# Patient Record
Sex: FEMALE | Race: BLACK OR AFRICAN AMERICAN | NOT HISPANIC OR LATINO | Employment: UNEMPLOYED | URBAN - METROPOLITAN AREA
[De-identification: names, ages, dates, MRNs, and addresses within clinical notes are randomized per-mention and may not be internally consistent; named-entity substitution may affect disease eponyms.]

---

## 2019-07-02 ENCOUNTER — TELEPHONE (OUTPATIENT)
Dept: OBSTETRICS AND GYNECOLOGY | Facility: CLINIC | Age: 29
End: 2019-07-02

## 2019-07-02 NOTE — TELEPHONE ENCOUNTER
----- Message from Benitez Mims sent at 7/2/2019  2:50 PM CDT -----  Contact: [phone]  Pt would be a new pregnancy pt and would  like to schedule an appt. Please call back at [phone].       Thank You,   Benitez Mims

## 2019-07-02 NOTE — TELEPHONE ENCOUNTER
Spoke to patient and scheduled her appointment for 07/05/19 at 7:40am to see CINDI Christine at the [place] location. Patient verbalized understanding. Aware to arrive 30 min prior to appt time.

## 2019-11-20 ENCOUNTER — TELEPHONE (OUTPATIENT)
Dept: OBSTETRICS AND GYNECOLOGY | Facility: CLINIC | Age: 29
End: 2019-11-20

## 2019-11-20 NOTE — TELEPHONE ENCOUNTER
----- Message from Laron Lopez sent at 11/19/2019  4:47 PM CST -----  Contact: pt   Pt would like to margo post partum appt, pt had bady 8/23/19         [phone]

## 2019-11-20 NOTE — TELEPHONE ENCOUNTER
"Returned call, she confirmed message.  Asked where did she delivered, she responded [facility] per Terrie.  Asked the date of delivery, she responded "August 26," per Terrie.  Made her aware that her postpartum period is most likely over, and encouraged her to contact [facility] for postpartum information, she verbalized understanding.  Asked her where she received prenatal care, she responded "[facility]," and also mentioned having her baby at [facility].  Asked her if she delivered at the [facility] or [facility], because of hearing both, she responded "[facility]."  Encouraged her to contact [facility] for care, and also made her aware that I could provide her with [facility] contact number.  She verbalized understanding and said that she is going to contact [facility].  "

## 2020-02-04 ENCOUNTER — OFFICE VISIT (OUTPATIENT)
Dept: PODIATRY | Facility: CLINIC | Age: 30
End: 2020-02-04
Payer: MEDICAID

## 2020-02-04 VITALS
DIASTOLIC BLOOD PRESSURE: 71 MMHG | WEIGHT: 126.56 LBS | SYSTOLIC BLOOD PRESSURE: 101 MMHG | HEIGHT: 63 IN | BODY MASS INDEX: 22.43 KG/M2 | HEART RATE: 48 BPM

## 2020-02-04 DIAGNOSIS — M79.672 LEFT FOOT PAIN: ICD-10-CM

## 2020-02-04 DIAGNOSIS — L85.1 ACQUIRED PLANTAR POROKERATOSIS: Primary | ICD-10-CM

## 2020-02-04 PROCEDURE — 99213 OFFICE O/P EST LOW 20 MIN: CPT | Mod: PBBFAC | Performed by: PODIATRIST

## 2020-02-04 PROCEDURE — 99203 PR OFFICE/OUTPT VISIT, NEW, LEVL III, 30-44 MIN: ICD-10-PCS | Mod: S$PBB,,, | Performed by: PODIATRIST

## 2020-02-04 PROCEDURE — 99999 PR PBB SHADOW E&M-EST. PATIENT-LVL III: CPT | Mod: PBBFAC,,, | Performed by: PODIATRIST

## 2020-02-04 PROCEDURE — 99203 OFFICE O/P NEW LOW 30 MIN: CPT | Mod: S$PBB,,, | Performed by: PODIATRIST

## 2020-02-04 PROCEDURE — 99999 PR PBB SHADOW E&M-EST. PATIENT-LVL III: ICD-10-PCS | Mod: PBBFAC,,, | Performed by: PODIATRIST

## 2020-02-04 RX ORDER — AZITHROMYCIN 250 MG/1
TABLET, FILM COATED ORAL
COMMUNITY
End: 2021-01-11

## 2020-02-04 RX ORDER — LEVETIRACETAM 100 MG/ML
SOLUTION ORAL
COMMUNITY
End: 2021-01-11

## 2020-02-04 NOTE — PROGRESS NOTES
Subjective:       Patient ID: Terrie Diaz is a 29 y.o. female.    Chief Complaint: Foot Pain (L foot corn. Rates pain 8/10. Non diabetic Pt. Wears casual shoes. PCP DR Altamirano.)    HPI: Terrie Diaz presents to the office today, with plantar left foot pain.  Patient states she has developed a corn to the plantar aspect of the left foot.  Report to the she in this lesion following her pregnancy.  Currently her youngest child is 5-month-old.  States she has been dealing with the discomfort for that period.  Reports that she has tried using over-the-counter corn pads without success.  She also reports that she has tried trimming this lesion down however it had reoccurred.  States that the only thing that makes it better is getting off her feet.  Reports that her pain is an 8/10.  She presents today in clinic wearing flats with no added support to the bottom of her foot.    Review of patient's allergies indicates:  No Known Allergies    History reviewed. No pertinent past medical history.    History reviewed. No pertinent family history.    Social History     Socioeconomic History    Marital status: Significant Other     Spouse name: Not on file    Number of children: Not on file    Years of education: Not on file    Highest education level: Not on file   Occupational History    Not on file   Social Needs    Financial resource strain: Not on file    Food insecurity:     Worry: Not on file     Inability: Not on file    Transportation needs:     Medical: Not on file     Non-medical: Not on file   Tobacco Use    Smoking status: Never Smoker    Smokeless tobacco: Never Used   Substance and Sexual Activity    Alcohol use: Not on file    Drug use: Not on file    Sexual activity: Not on file   Lifestyle    Physical activity:     Days per week: Not on file     Minutes per session: Not on file    Stress: Not on file   Relationships    Social connections:     Talks on phone: Not on file     Gets together:  "Not on file     Attends Bahai service: Not on file     Active member of club or organization: Not on file     Attends meetings of clubs or organizations: Not on file     Relationship status: Not on file   Other Topics Concern    Not on file   Social History Narrative    Not on file       History reviewed. No pertinent surgical history.    Review of Systems   Constitutional: Negative for activity change, appetite change, chills and fever.   HENT: Negative for sinus pain, sore throat and voice change.    Eyes: Negative for pain, redness and visual disturbance.   Respiratory: Negative for cough and shortness of breath.    Cardiovascular: Negative for chest pain and palpitations.   Gastrointestinal: Negative for diarrhea, nausea and vomiting.   Musculoskeletal: Negative for back pain and joint swelling.   Skin: Negative for color change and wound.   Neurological: Negative for dizziness, weakness and numbness.   Psychiatric/Behavioral: The patient is not nervous/anxious.           Objective:   /71 (BP Location: Left arm, Patient Position: Sitting, BP Method: Small (Automatic))   Pulse (!) 48   Ht 5' 3" (1.6 m)   Wt 57.4 kg (126 lb 8.7 oz)   BMI 22.42 kg/m²     No image results found.       Physical Exam   LOWER EXTREMITY PHYSICAL EXAMINATION    Vascular:  Dorsalis pedis pulse on the right 2/4, on the left 2/4.  Posterior tibial pulse on the right 2/4, on the left 2/4.  Capillary refill intact less than 3 sec of bilateral lower extremities.  Pedal hair growth is present to the dorsal aspect of the foot and digits bilaterally.  No presence of edema to lower extremities. Rubor on dependency is noted.   Neurological:  Sensation light touch, proprioception, sensation to pinprick, protective sensation are present and equal bilaterally. No numbness or tingling identified to the digits. Negative Tinel sign.    Musculoskeletal:  Pain on direct palpation of a plantar left foot lesion sub 4th metatarsal head area.  " Manual Muscle Testing is 5/5 with dorsiflexion, plantar flexion, abduction, and adduction.   There is normal range of motion in the forefoot, hindfoot, and Ankle joint. No pain with muscle testing with or without resistance. Gait pattern is non-antalgic.     Dermatological:  Skin is supple, moist, intact.  There is a 0.4 x 0.3 cm lesion to the plantar aspect of the left foot near the 4th metatarsal head region.  There is a centralized white cord this area.  Area is likely associated with a verruca or porokeratosis.  Upon debridement/trimming of the thickened hyperkeratotic tissue, there is no pinpoint bleeding.  There is a white centralize core which was removed.  Discussed with patient this is a porokeratosis.  There is no ulceration, callusing, or other lesions identified to the plantar aspect of the left foot.  thin, shiny, and atrophic.  Hair growth is intact.    Assessment:     1. Acquired plantar porokeratosis    2. Left foot pain        Plan:     Acquired plantar porokeratosis    Left foot pain      Discussed with patient the etiology of porokeratosis.  Hypertrophic skin formation, as outlined within the examination portion of this note, is/are trimmed/pared surgically debrided with sharp #10/#15 blade, to alleviate discomfort with weight bearing and ambulation, and to lessen the possibility of skin complications, e.g., ulceration due to pressure. No ulceration(s) is are noted with/post debridement.     Encouraged the patient to utilize mole in or Vaseline petroleum jelly following bathing or foot soaks to keep the skin moist and supple while the area continues to heal.  Also encouraged patient to apply socks following this so she does not encounter any falls or slipping.    Recommend patient to utilize offloading pads/horseshoe shape pad to this area to prevent excessive overloading at this location allow the skin to heal appropriately.      No future appointments.

## 2020-03-23 ENCOUNTER — TELEPHONE (OUTPATIENT)
Dept: PODIATRY | Facility: CLINIC | Age: 30
End: 2020-03-23

## 2020-03-23 NOTE — TELEPHONE ENCOUNTER
Contacted pt regarding appointment request, pt stated she was experiencing pain in left foot corn. Advised pt as it was a non emergent condition, her appointment request will have to be moved 30 days due to covid-19. Advised pt to use an offloading pad and apply vaseline to corn after shower. Pt verbalized understanding and an appointment was scheduled for 4/23/2020 at 9:20 am.             Thank You,  Danna Lindsay MA  Podiatry Surgical Department  [facility]                    ----- Message from Tamara Stuart sent at 3/21/2020  9:03 AM CDT -----  Contact: PT   PT is requesting a call back in regards to having a soon appointment for a corn on her right foot   She can be reached at [phone]    Thanks

## 2020-04-20 ENCOUNTER — TELEPHONE (OUTPATIENT)
Dept: PODIATRY | Facility: CLINIC | Age: 30
End: 2020-04-20

## 2020-04-20 NOTE — TELEPHONE ENCOUNTER
Attempted to contact patient regarding appointment on 4/23/2020. Patient hung up mid conversation,  a second and third attempt was made to contact pt. She did not answer, left detailed voice message with number requesting call back.        Thank You,  Danna Lindsay MA  Podiatry Surgical Department  [facility]

## 2020-04-20 NOTE — TELEPHONE ENCOUNTER
Returned pt's call regarding appointment for foot corn. Advised pt that as her appointment is considered cosmetic, she would have to pay $42. Pt expressed anger and hung up the phone. I tried calling back, however phone call was sent to [facility].        Thank You,  Danna Lindsay MA  Podiatry Surgical Department  [facility]                              ----- Message from Gail Harvey sent at 4/20/2020 12:20 PM CDT -----  Contact: charla/Adebayo -[phone]  Type:  Patient Returning Call    Who Called:.Adebayo  Who Left Message for Patient:nurse  Does the patient know what this is regarding?:appt  Would the patient rather a call back or a response via [facility]? Call back   Best Call Back Number:[phone]  Additional Information:

## 2021-01-11 ENCOUNTER — OFFICE VISIT (OUTPATIENT)
Dept: OBSTETRICS AND GYNECOLOGY | Facility: CLINIC | Age: 31
End: 2021-01-11
Payer: MEDICAID

## 2021-01-11 ENCOUNTER — LAB VISIT (OUTPATIENT)
Dept: LAB | Facility: HOSPITAL | Age: 31
End: 2021-01-11
Attending: ADVANCED PRACTICE MIDWIFE
Payer: MEDICARE

## 2021-01-11 VITALS
DIASTOLIC BLOOD PRESSURE: 62 MMHG | SYSTOLIC BLOOD PRESSURE: 106 MMHG | HEIGHT: 63 IN | BODY MASS INDEX: 23.86 KG/M2 | WEIGHT: 134.69 LBS

## 2021-01-11 DIAGNOSIS — O09.30 LATE PRENATAL CARE: ICD-10-CM

## 2021-01-11 DIAGNOSIS — Z32.00 POSSIBLE PREGNANCY: ICD-10-CM

## 2021-01-11 DIAGNOSIS — O09.299 HISTORY OF PRE-ECLAMPSIA IN PRIOR PREGNANCY, CURRENTLY PREGNANT: ICD-10-CM

## 2021-01-11 DIAGNOSIS — Z32.00 POSSIBLE PREGNANCY: Primary | ICD-10-CM

## 2021-01-11 DIAGNOSIS — Z98.891 HISTORY OF VAGINAL DELIVERY FOLLOWING PREVIOUS CESAREAN DELIVERY: ICD-10-CM

## 2021-01-11 LAB
BACTERIA #/AREA URNS HPF: ABNORMAL /HPF
BASOPHILS # BLD AUTO: 0.02 K/UL (ref 0–0.2)
BASOPHILS NFR BLD: 0.2 % (ref 0–1.9)
BILIRUB UR QL STRIP: NEGATIVE
CLARITY UR: ABNORMAL
COLOR UR: YELLOW
DIFFERENTIAL METHOD: ABNORMAL
EOSINOPHIL # BLD AUTO: 0.1 K/UL (ref 0–0.5)
EOSINOPHIL NFR BLD: 1.1 % (ref 0–8)
ERYTHROCYTE [DISTWIDTH] IN BLOOD BY AUTOMATED COUNT: 13.2 % (ref 11.5–14.5)
GLUCOSE UR QL STRIP: ABNORMAL
HCT VFR BLD AUTO: 31.4 % (ref 37–48.5)
HGB BLD-MCNC: 9.6 G/DL (ref 12–16)
HGB UR QL STRIP: NEGATIVE
IMM GRANULOCYTES # BLD AUTO: 0.05 K/UL (ref 0–0.04)
IMM GRANULOCYTES NFR BLD AUTO: 0.5 % (ref 0–0.5)
KETONES UR QL STRIP: NEGATIVE
LEUKOCYTE ESTERASE UR QL STRIP: NEGATIVE
LYMPHOCYTES # BLD AUTO: 1.4 K/UL (ref 1–4.8)
LYMPHOCYTES NFR BLD: 14.5 % (ref 18–48)
MCH RBC QN AUTO: 31.2 PG (ref 27–31)
MCHC RBC AUTO-ENTMCNC: 30.6 G/DL (ref 32–36)
MCV RBC AUTO: 102 FL (ref 82–98)
MICROSCOPIC COMMENT: ABNORMAL
MONOCYTES # BLD AUTO: 0.8 K/UL (ref 0.3–1)
MONOCYTES NFR BLD: 7.9 % (ref 4–15)
NEUTROPHILS # BLD AUTO: 7.5 K/UL (ref 1.8–7.7)
NEUTROPHILS NFR BLD: 75.8 % (ref 38–73)
NITRITE UR QL STRIP: NEGATIVE
NRBC BLD-RTO: 0 /100 WBC
PH UR STRIP: 6 [PH] (ref 5–8)
PLATELET # BLD AUTO: 154 K/UL (ref 150–350)
PMV BLD AUTO: 11.8 FL (ref 9.2–12.9)
PROT UR QL STRIP: NEGATIVE
RBC # BLD AUTO: 3.08 M/UL (ref 4–5.4)
RBC #/AREA URNS HPF: 4 /HPF (ref 0–4)
SP GR UR STRIP: 1.02 (ref 1–1.03)
SQUAMOUS #/AREA URNS HPF: 7 /HPF
URN SPEC COLLECT METH UR: ABNORMAL
WBC # BLD AUTO: 9.88 K/UL (ref 3.9–12.7)

## 2021-01-11 PROCEDURE — 81000 URINALYSIS NONAUTO W/SCOPE: CPT

## 2021-01-11 PROCEDURE — 87491 CHLMYD TRACH DNA AMP PROBE: CPT

## 2021-01-11 PROCEDURE — 85025 COMPLETE CBC W/AUTO DIFF WBC: CPT

## 2021-01-11 PROCEDURE — 99204 PR OFFICE/OUTPT VISIT, NEW, LEVL IV, 45-59 MIN: ICD-10-PCS | Mod: S$GLB,,, | Performed by: ADVANCED PRACTICE MIDWIFE

## 2021-01-11 PROCEDURE — 85660 RBC SICKLE CELL TEST: CPT

## 2021-01-11 PROCEDURE — 86592 SYPHILIS TEST NON-TREP QUAL: CPT

## 2021-01-11 PROCEDURE — 80074 ACUTE HEPATITIS PANEL: CPT

## 2021-01-11 PROCEDURE — 86703 HIV-1/HIV-2 1 RESULT ANTBDY: CPT

## 2021-01-11 PROCEDURE — 99213 OFFICE O/P EST LOW 20 MIN: CPT | Mod: PBBFAC,TH | Performed by: ADVANCED PRACTICE MIDWIFE

## 2021-01-11 PROCEDURE — 87086 URINE CULTURE/COLONY COUNT: CPT

## 2021-01-11 PROCEDURE — 99999 PR PBB SHADOW E&M-EST. PATIENT-LVL III: CPT | Mod: PBBFAC,,, | Performed by: ADVANCED PRACTICE MIDWIFE

## 2021-01-11 PROCEDURE — 87591 N.GONORRHOEAE DNA AMP PROB: CPT

## 2021-01-11 PROCEDURE — 86900 BLOOD TYPING SEROLOGIC ABO: CPT

## 2021-01-11 PROCEDURE — 86762 RUBELLA ANTIBODY: CPT

## 2021-01-11 PROCEDURE — 99204 OFFICE O/P NEW MOD 45 MIN: CPT | Mod: S$GLB,,, | Performed by: ADVANCED PRACTICE MIDWIFE

## 2021-01-11 PROCEDURE — 36415 COLL VENOUS BLD VENIPUNCTURE: CPT

## 2021-01-11 PROCEDURE — 88175 CYTOPATH C/V AUTO FLUID REDO: CPT

## 2021-01-11 PROCEDURE — 99999 PR PBB SHADOW E&M-EST. PATIENT-LVL III: ICD-10-PCS | Mod: PBBFAC,,, | Performed by: ADVANCED PRACTICE MIDWIFE

## 2021-01-12 PROBLEM — O09.30 LATE PRENATAL CARE: Status: ACTIVE | Noted: 2021-01-12

## 2021-01-12 PROBLEM — O09.299 HISTORY OF PRE-ECLAMPSIA IN PRIOR PREGNANCY, CURRENTLY PREGNANT: Status: ACTIVE | Noted: 2021-01-12

## 2021-01-12 PROBLEM — Z98.891 HISTORY OF VAGINAL DELIVERY FOLLOWING PREVIOUS CESAREAN DELIVERY: Status: ACTIVE | Noted: 2021-01-12

## 2021-01-12 LAB
ABO + RH BLD: NORMAL
B-HCG UR QL: POSITIVE
BLD GP AB SCN CELLS X3 SERPL QL: NORMAL
CTP QC/QA: YES
HAV IGM SERPL QL IA: NEGATIVE
HBV CORE IGM SERPL QL IA: NEGATIVE
HBV SURFACE AG SERPL QL IA: NEGATIVE
HCV AB SERPL QL IA: NEGATIVE
HGB S BLD QL SOLY: NEGATIVE
HIV 1+2 AB+HIV1 P24 AG SERPL QL IA: NEGATIVE
RPR SER QL: NORMAL
RUBV IGG SER-ACNC: 122 IU/ML
RUBV IGG SER-IMP: REACTIVE

## 2021-01-13 LAB
C TRACH DNA SPEC QL NAA+PROBE: NOT DETECTED
N GONORRHOEA DNA SPEC QL NAA+PROBE: NOT DETECTED

## 2021-01-14 ENCOUNTER — TELEPHONE (OUTPATIENT)
Dept: OBSTETRICS AND GYNECOLOGY | Facility: CLINIC | Age: 31
End: 2021-01-14

## 2021-01-14 LAB — BACTERIA UR CULT: NORMAL

## 2021-01-14 RX ORDER — PROMETHAZINE HYDROCHLORIDE 12.5 MG/1
12.5 TABLET ORAL 4 TIMES DAILY
Qty: 30 TABLET | Refills: 2 | Status: ON HOLD | OUTPATIENT
Start: 2021-01-14 | End: 2023-04-26 | Stop reason: HOSPADM

## 2021-01-19 ENCOUNTER — INITIAL PRENATAL (OUTPATIENT)
Dept: OBSTETRICS AND GYNECOLOGY | Facility: CLINIC | Age: 31
End: 2021-01-19
Payer: MEDICARE

## 2021-01-19 ENCOUNTER — PROCEDURE VISIT (OUTPATIENT)
Dept: OBSTETRICS AND GYNECOLOGY | Facility: CLINIC | Age: 31
End: 2021-01-19
Payer: MEDICARE

## 2021-01-19 ENCOUNTER — PATIENT MESSAGE (OUTPATIENT)
Dept: OBSTETRICS AND GYNECOLOGY | Facility: CLINIC | Age: 31
End: 2021-01-19

## 2021-01-19 VITALS
BODY MASS INDEX: 22.81 KG/M2 | WEIGHT: 128.75 LBS | SYSTOLIC BLOOD PRESSURE: 122 MMHG | DIASTOLIC BLOOD PRESSURE: 68 MMHG

## 2021-01-19 DIAGNOSIS — Z32.00 POSSIBLE PREGNANCY: ICD-10-CM

## 2021-01-19 DIAGNOSIS — Z98.891 HISTORY OF VAGINAL DELIVERY FOLLOWING PREVIOUS CESAREAN DELIVERY: Primary | ICD-10-CM

## 2021-01-19 DIAGNOSIS — O09.299 HISTORY OF PRE-ECLAMPSIA IN PRIOR PREGNANCY, CURRENTLY PREGNANT: ICD-10-CM

## 2021-01-19 PROCEDURE — 99999 PR PBB SHADOW E&M-EST. PATIENT-LVL III: ICD-10-PCS | Mod: PBBFAC,,, | Performed by: ADVANCED PRACTICE MIDWIFE

## 2021-01-19 PROCEDURE — 0502F PR SUBSEQUENT PRENATAL CARE: ICD-10-PCS | Mod: S$GLB,,, | Performed by: ADVANCED PRACTICE MIDWIFE

## 2021-01-19 PROCEDURE — 0502F SUBSEQUENT PRENATAL CARE: CPT | Mod: S$GLB,,, | Performed by: ADVANCED PRACTICE MIDWIFE

## 2021-01-19 PROCEDURE — 76819 FETAL BIOPHYS PROFIL W/O NST: CPT | Mod: S$GLB,ICN,, | Performed by: OBSTETRICS & GYNECOLOGY

## 2021-01-19 PROCEDURE — 90686 IIV4 VACC NO PRSV 0.5 ML IM: CPT | Mod: PBBFAC

## 2021-01-19 PROCEDURE — 76819 PR US, OB, FETAL BIOPHYSICAL, W/O NST: ICD-10-PCS | Mod: S$GLB,ICN,, | Performed by: OBSTETRICS & GYNECOLOGY

## 2021-01-19 PROCEDURE — 99999 PR PBB SHADOW E&M-EST. PATIENT-LVL III: CPT | Mod: PBBFAC,,, | Performed by: ADVANCED PRACTICE MIDWIFE

## 2021-01-19 PROCEDURE — 76816 OB US FOLLOW-UP PER FETUS: CPT | Mod: S$GLB,ICN,, | Performed by: OBSTETRICS & GYNECOLOGY

## 2021-01-19 PROCEDURE — 76816 OB US FOLLOW-UP PER FETUS: CPT | Mod: PBBFAC | Performed by: OBSTETRICS & GYNECOLOGY

## 2021-01-19 PROCEDURE — 99213 OFFICE O/P EST LOW 20 MIN: CPT | Mod: PBBFAC,TH,25 | Performed by: ADVANCED PRACTICE MIDWIFE

## 2021-01-19 PROCEDURE — 76819 FETAL BIOPHYS PROFIL W/O NST: CPT | Mod: PBBFAC | Performed by: OBSTETRICS & GYNECOLOGY

## 2021-01-19 PROCEDURE — 90472 IMMUNIZATION ADMIN EACH ADD: CPT | Mod: PBBFAC

## 2021-01-19 PROCEDURE — 90715 TDAP VACCINE 7 YRS/> IM: CPT | Mod: PBBFAC

## 2021-01-19 PROCEDURE — 76816 PR  US,PREGNANT UTERUS,F/U,TRANSABD APP: ICD-10-PCS | Mod: S$GLB,ICN,, | Performed by: OBSTETRICS & GYNECOLOGY

## 2021-01-19 RX ORDER — SERTRALINE HYDROCHLORIDE 25 MG/1
25 TABLET, FILM COATED ORAL DAILY
Qty: 30 TABLET | Refills: 2 | Status: ON HOLD | OUTPATIENT
Start: 2021-01-19 | End: 2023-04-26 | Stop reason: HOSPADM

## 2021-01-19 RX ORDER — FERROUS SULFATE 325(65) MG
325 TABLET, DELAYED RELEASE (ENTERIC COATED) ORAL DAILY
Qty: 60 TABLET | Refills: 3 | Status: SHIPPED | OUTPATIENT
Start: 2021-01-19 | End: 2022-01-19

## 2021-01-24 LAB
FINAL PATHOLOGIC DIAGNOSIS: NORMAL
Lab: NORMAL

## 2021-02-16 ENCOUNTER — HOSPITAL ENCOUNTER (INPATIENT)
Facility: HOSPITAL | Age: 31
LOS: 2 days | Discharge: HOME | End: 2021-02-18
Attending: OBSTETRICS & GYNECOLOGY | Admitting: OBSTETRICS & GYNECOLOGY
Payer: MEDICARE

## 2021-02-16 DIAGNOSIS — O34.219 VBAC, DELIVERED: ICD-10-CM

## 2021-02-16 PROBLEM — O09.30 LATE PRENATAL CARE: Status: RESOLVED | Noted: 2021-01-12 | Resolved: 2021-02-16

## 2021-02-16 LAB
AMPHET+METHAMPHET UR QL: NEGATIVE
BARBITURATES UR QL SCN>200 NG/ML: NEGATIVE
BENZODIAZ UR QL SCN>200 NG/ML: NEGATIVE
BZE UR QL SCN: NEGATIVE
CANNABINOIDS UR QL SCN: NORMAL
CREAT UR-MCNC: 149.9 MG/DL (ref 15–325)
METHADONE UR QL SCN>300 NG/ML: NEGATIVE
OPIATES UR QL SCN: NEGATIVE
PCP UR QL SCN>25 NG/ML: NEGATIVE
SARS-COV-2 RDRP RESP QL NAA+PROBE: NEGATIVE
TOXICOLOGY INFORMATION: NORMAL

## 2021-02-16 PROCEDURE — 25000003 PHARM REV CODE 250: Performed by: MIDWIFE

## 2021-02-16 PROCEDURE — 80307 DRUG TEST PRSMV CHEM ANLYZR: CPT

## 2021-02-16 PROCEDURE — 59414 DELIVER PLACENTA: CPT | Mod: ,,, | Performed by: MIDWIFE

## 2021-02-16 PROCEDURE — 51701 INSERT BLADDER CATHETER: CPT

## 2021-02-16 PROCEDURE — 63600175 PHARM REV CODE 636 W HCPCS: Performed by: MIDWIFE

## 2021-02-16 PROCEDURE — 59414 PR DELIVER PLACENTA: ICD-10-PCS | Mod: ,,, | Performed by: MIDWIFE

## 2021-02-16 PROCEDURE — 11000001 HC ACUTE MED/SURG PRIVATE ROOM

## 2021-02-16 PROCEDURE — 59414 DELIVER PLACENTA: CPT

## 2021-02-16 PROCEDURE — U0002 COVID-19 LAB TEST NON-CDC: HCPCS

## 2021-02-16 RX ORDER — SIMETHICONE 80 MG
1 TABLET,CHEWABLE ORAL EVERY 6 HOURS PRN
Status: DISCONTINUED | OUTPATIENT
Start: 2021-02-16 | End: 2021-02-18 | Stop reason: HOSPADM

## 2021-02-16 RX ORDER — PRENATAL WITH FERROUS FUM AND FOLIC ACID 3080; 920; 120; 400; 22; 1.84; 3; 20; 10; 1; 12; 200; 27; 25; 2 [IU]/1; [IU]/1; MG/1; [IU]/1; MG/1; MG/1; MG/1; MG/1; MG/1; MG/1; UG/1; MG/1; MG/1; MG/1; MG/1
1 TABLET ORAL DAILY
Status: DISCONTINUED | OUTPATIENT
Start: 2021-02-16 | End: 2021-02-18 | Stop reason: HOSPADM

## 2021-02-16 RX ORDER — DIPHENHYDRAMINE HYDROCHLORIDE 50 MG/ML
25 INJECTION INTRAMUSCULAR; INTRAVENOUS EVERY 4 HOURS PRN
Status: DISCONTINUED | OUTPATIENT
Start: 2021-02-16 | End: 2021-02-18 | Stop reason: HOSPADM

## 2021-02-16 RX ORDER — DIPHENHYDRAMINE HCL 25 MG
25 CAPSULE ORAL EVERY 4 HOURS PRN
Status: DISCONTINUED | OUTPATIENT
Start: 2021-02-16 | End: 2021-02-18 | Stop reason: HOSPADM

## 2021-02-16 RX ORDER — ACETAMINOPHEN 325 MG/1
650 TABLET ORAL EVERY 6 HOURS PRN
Status: DISCONTINUED | OUTPATIENT
Start: 2021-02-16 | End: 2021-02-18 | Stop reason: HOSPADM

## 2021-02-16 RX ORDER — DOCUSATE SODIUM 100 MG/1
200 CAPSULE, LIQUID FILLED ORAL 2 TIMES DAILY PRN
Status: DISCONTINUED | OUTPATIENT
Start: 2021-02-16 | End: 2021-02-18 | Stop reason: HOSPADM

## 2021-02-16 RX ORDER — HYDROCORTISONE 25 MG/G
CREAM TOPICAL 3 TIMES DAILY PRN
Status: DISCONTINUED | OUTPATIENT
Start: 2021-02-16 | End: 2021-02-18 | Stop reason: HOSPADM

## 2021-02-16 RX ORDER — SERTRALINE HYDROCHLORIDE 25 MG/1
25 TABLET, FILM COATED ORAL DAILY
Status: DISCONTINUED | OUTPATIENT
Start: 2021-02-16 | End: 2021-02-17 | Stop reason: SDUPTHER

## 2021-02-16 RX ORDER — OXYTOCIN/RINGER'S LACTATE 30/500 ML
95 PLASTIC BAG, INJECTION (ML) INTRAVENOUS ONCE
Status: COMPLETED | OUTPATIENT
Start: 2021-02-16 | End: 2021-02-16

## 2021-02-16 RX ORDER — HYDROCODONE BITARTRATE AND ACETAMINOPHEN 5; 325 MG/1; MG/1
1 TABLET ORAL EVERY 4 HOURS PRN
Status: DISCONTINUED | OUTPATIENT
Start: 2021-02-16 | End: 2021-02-18 | Stop reason: HOSPADM

## 2021-02-16 RX ORDER — IBUPROFEN 600 MG/1
600 TABLET ORAL EVERY 6 HOURS
Status: DISCONTINUED | OUTPATIENT
Start: 2021-02-16 | End: 2021-02-18 | Stop reason: HOSPADM

## 2021-02-16 RX ORDER — ONDANSETRON 8 MG/1
8 TABLET, ORALLY DISINTEGRATING ORAL EVERY 8 HOURS PRN
Status: DISCONTINUED | OUTPATIENT
Start: 2021-02-16 | End: 2021-02-18 | Stop reason: HOSPADM

## 2021-02-16 RX ADMIN — Medication 999 MILLI-UNITS/MIN: at 02:02

## 2021-02-16 RX ADMIN — HYDROCODONE BITARTRATE AND ACETAMINOPHEN 1 TABLET: 5; 325 TABLET ORAL at 03:02

## 2021-02-16 RX ADMIN — HYDROCODONE BITARTRATE AND ACETAMINOPHEN 1 TABLET: 5; 325 TABLET ORAL at 10:02

## 2021-02-16 RX ADMIN — PROMETHAZINE HYDROCHLORIDE 12.5 MG: 25 INJECTION INTRAMUSCULAR; INTRAVENOUS at 02:02

## 2021-02-16 RX ADMIN — IBUPROFEN 600 MG: 600 TABLET ORAL at 11:02

## 2021-02-16 RX ADMIN — IBUPROFEN 600 MG: 600 TABLET ORAL at 03:02

## 2021-02-17 PROBLEM — Z86.59 HISTORY OF ANXIETY DISORDER: Status: ACTIVE | Noted: 2021-02-17

## 2021-02-17 PROCEDURE — 25000003 PHARM REV CODE 250: Performed by: MIDWIFE

## 2021-02-17 PROCEDURE — 11000001 HC ACUTE MED/SURG PRIVATE ROOM

## 2021-02-17 PROCEDURE — 99024 PR POST-OP FOLLOW-UP VISIT: ICD-10-PCS | Mod: ,,, | Performed by: ADVANCED PRACTICE MIDWIFE

## 2021-02-17 PROCEDURE — 25000003 PHARM REV CODE 250: Performed by: ADVANCED PRACTICE MIDWIFE

## 2021-02-17 PROCEDURE — 99024 POSTOP FOLLOW-UP VISIT: CPT | Mod: ,,, | Performed by: ADVANCED PRACTICE MIDWIFE

## 2021-02-17 RX ORDER — SERTRALINE HYDROCHLORIDE 25 MG/1
25 TABLET, FILM COATED ORAL DAILY
Status: DISCONTINUED | OUTPATIENT
Start: 2021-02-17 | End: 2021-02-18 | Stop reason: HOSPADM

## 2021-02-17 RX ORDER — CHLORHEXIDINE GLUCONATE ORAL RINSE 1.2 MG/ML
15 SOLUTION DENTAL 2 TIMES DAILY
Status: DISCONTINUED | OUTPATIENT
Start: 2021-02-17 | End: 2021-02-18 | Stop reason: HOSPADM

## 2021-02-17 RX ADMIN — PRENATAL VITAMINS-IRON FUMARATE 27 MG IRON-FOLIC ACID 0.8 MG TABLET 1 TABLET: at 08:02

## 2021-02-17 RX ADMIN — IBUPROFEN 600 MG: 600 TABLET ORAL at 11:02

## 2021-02-17 RX ADMIN — HYDROCODONE BITARTRATE AND ACETAMINOPHEN 1 TABLET: 5; 325 TABLET ORAL at 11:02

## 2021-02-17 RX ADMIN — HYDROCODONE BITARTRATE AND ACETAMINOPHEN 1 TABLET: 5; 325 TABLET ORAL at 07:02

## 2021-02-17 RX ADMIN — IBUPROFEN 600 MG: 600 TABLET ORAL at 05:02

## 2021-02-17 RX ADMIN — CHLORHEXIDINE GLUCONATE 0.12% ORAL RINSE 15 ML: 1.2 LIQUID ORAL at 11:02

## 2021-02-17 RX ADMIN — HYDROCODONE BITARTRATE AND ACETAMINOPHEN 1 TABLET: 5; 325 TABLET ORAL at 09:02

## 2021-02-17 RX ADMIN — SERTRALINE HYDROCHLORIDE 25 MG: 25 TABLET ORAL at 09:02

## 2021-02-18 ENCOUNTER — TELEPHONE (OUTPATIENT)
Dept: OBSTETRICS AND GYNECOLOGY | Facility: HOSPITAL | Age: 31
End: 2021-02-18

## 2021-02-18 ENCOUNTER — DOCUMENTATION ONLY (OUTPATIENT)
Dept: LACTATION | Facility: CLINIC | Age: 31
End: 2021-02-18

## 2021-02-18 VITALS
RESPIRATION RATE: 16 BRPM | DIASTOLIC BLOOD PRESSURE: 72 MMHG | TEMPERATURE: 98 F | SYSTOLIC BLOOD PRESSURE: 111 MMHG | OXYGEN SATURATION: 100 % | HEART RATE: 60 BPM

## 2021-02-18 PROCEDURE — 25000003 PHARM REV CODE 250: Performed by: MIDWIFE

## 2021-02-18 PROCEDURE — 25000003 PHARM REV CODE 250: Performed by: ADVANCED PRACTICE MIDWIFE

## 2021-02-18 RX ORDER — HYDROCODONE BITARTRATE AND ACETAMINOPHEN 5; 325 MG/1; MG/1
1 TABLET ORAL EVERY 6 HOURS PRN
Qty: 20 TABLET | Refills: 0 | Status: SHIPPED | OUTPATIENT
Start: 2021-02-18 | End: 2021-02-18

## 2021-02-18 RX ORDER — HYDROCODONE BITARTRATE AND ACETAMINOPHEN 5; 325 MG/1; MG/1
1 TABLET ORAL EVERY 6 HOURS PRN
Qty: 15 TABLET | Refills: 0 | Status: ON HOLD | OUTPATIENT
Start: 2021-02-18 | End: 2023-04-26 | Stop reason: HOSPADM

## 2021-02-18 RX ORDER — IBUPROFEN 600 MG/1
600 TABLET ORAL EVERY 6 HOURS PRN
Qty: 30 TABLET | Refills: 0 | Status: ON HOLD | OUTPATIENT
Start: 2021-02-18 | End: 2023-04-26 | Stop reason: HOSPADM

## 2021-02-18 RX ORDER — IBUPROFEN 600 MG/1
600 TABLET ORAL EVERY 6 HOURS PRN
Qty: 30 TABLET | Refills: 0 | Status: SHIPPED | OUTPATIENT
Start: 2021-02-18 | End: 2021-02-18

## 2021-02-18 RX ADMIN — SERTRALINE HYDROCHLORIDE 25 MG: 25 TABLET ORAL at 09:02

## 2021-02-18 RX ADMIN — CHLORHEXIDINE GLUCONATE 0.12% ORAL RINSE 15 ML: 1.2 LIQUID ORAL at 09:02

## 2021-02-18 RX ADMIN — PRENATAL VITAMINS-IRON FUMARATE 27 MG IRON-FOLIC ACID 0.8 MG TABLET 1 TABLET: at 09:02

## 2021-02-18 RX ADMIN — IBUPROFEN 600 MG: 600 TABLET ORAL at 05:02

## 2021-02-18 RX ADMIN — HYDROCODONE BITARTRATE AND ACETAMINOPHEN 1 TABLET: 5; 325 TABLET ORAL at 09:02

## 2021-02-21 ENCOUNTER — TELEPHONE (OUTPATIENT)
Dept: OBSTETRICS AND GYNECOLOGY | Facility: HOSPITAL | Age: 31
End: 2021-02-21

## 2021-04-27 ENCOUNTER — TELEPHONE (OUTPATIENT)
Dept: OBSTETRICS AND GYNECOLOGY | Facility: CLINIC | Age: 31
End: 2021-04-27

## 2021-12-20 PROBLEM — O09.299 HISTORY OF PRE-ECLAMPSIA IN PRIOR PREGNANCY, CURRENTLY PREGNANT: Status: RESOLVED | Noted: 2021-01-12 | Resolved: 2021-12-20

## 2022-02-04 ENCOUNTER — TELEPHONE (OUTPATIENT)
Dept: OBSTETRICS AND GYNECOLOGY | Facility: CLINIC | Age: 32
End: 2022-02-04
Payer: MEDICARE

## 2022-02-04 NOTE — TELEPHONE ENCOUNTER
----- Message from Colton Mims sent at 2/4/2022 10:08 AM CST -----  Contact: [phone]  Patient is calling to schedule an appt. Please call back at [phone]. Thanks/lorri

## 2023-04-25 ENCOUNTER — HOSPITAL ENCOUNTER (OUTPATIENT)
Facility: OTHER | Age: 33
Discharge: HOME | End: 2023-04-26
Attending: EMERGENCY MEDICINE | Admitting: INTERNAL MEDICINE
Payer: MEDICARE

## 2023-04-25 DIAGNOSIS — R00.0 TACHYCARDIA: ICD-10-CM

## 2023-04-25 DIAGNOSIS — R56.9 SEIZURE: Primary | ICD-10-CM

## 2023-04-25 DIAGNOSIS — R56.9 SEIZURES: ICD-10-CM

## 2023-04-25 PROBLEM — O34.219 VBAC, DELIVERED: Status: RESOLVED | Noted: 2021-02-16 | Resolved: 2023-04-25

## 2023-04-25 PROBLEM — Z87.59 HISTORY OF PRE-ECLAMPSIA: Chronic | Status: ACTIVE | Noted: 2021-01-12

## 2023-04-25 PROBLEM — Z86.59 HISTORY OF ANXIETY DISORDER: Chronic | Status: ACTIVE | Noted: 2021-02-17

## 2023-04-25 PROBLEM — Z87.59 HISTORY OF PRE-ECLAMPSIA: Status: ACTIVE | Noted: 2021-01-12

## 2023-04-25 PROBLEM — Z87.820 HISTORY OF TRAUMATIC BRAIN INJURY: Chronic | Status: ACTIVE | Noted: 2023-04-25

## 2023-04-25 PROBLEM — Z98.891 HISTORY OF VAGINAL DELIVERY FOLLOWING PREVIOUS CESAREAN DELIVERY: Chronic | Status: ACTIVE | Noted: 2021-01-12

## 2023-04-25 LAB
ALBUMIN SERPL BCP-MCNC: 3.9 G/DL (ref 3.5–5.2)
ALP SERPL-CCNC: 87 U/L (ref 55–135)
ALT SERPL W/O P-5'-P-CCNC: 27 U/L (ref 10–44)
AMPHET+METHAMPHET UR QL: NEGATIVE
ANION GAP SERPL CALC-SCNC: 17 MMOL/L (ref 8–16)
AST SERPL-CCNC: 30 U/L (ref 10–40)
B-HCG UR QL: NEGATIVE
BACTERIA #/AREA URNS HPF: ABNORMAL /HPF
BARBITURATES UR QL SCN>200 NG/ML: NEGATIVE
BASOPHILS # BLD AUTO: 0.05 K/UL (ref 0–0.2)
BASOPHILS NFR BLD: 0.6 % (ref 0–1.9)
BENZODIAZ UR QL SCN>200 NG/ML: NEGATIVE
BILIRUB SERPL-MCNC: 0.2 MG/DL (ref 0.1–1)
BILIRUB UR QL STRIP: NEGATIVE
BUN SERPL-MCNC: 17 MG/DL (ref 6–20)
BZE UR QL SCN: NEGATIVE
CALCIUM SERPL-MCNC: 9.2 MG/DL (ref 8.7–10.5)
CANNABINOIDS UR QL SCN: ABNORMAL
CHLORIDE SERPL-SCNC: 104 MMOL/L (ref 95–110)
CLARITY UR: ABNORMAL
CO2 SERPL-SCNC: 13 MMOL/L (ref 23–29)
COLOR UR: YELLOW
CREAT SERPL-MCNC: 1 MG/DL (ref 0.5–1.4)
CREAT UR-MCNC: 118.8 MG/DL (ref 15–325)
CTP QC/QA: YES
DIFFERENTIAL METHOD: ABNORMAL
EOSINOPHIL # BLD AUTO: 0.2 K/UL (ref 0–0.5)
EOSINOPHIL NFR BLD: 2.2 % (ref 0–8)
ERYTHROCYTE [DISTWIDTH] IN BLOOD BY AUTOMATED COUNT: 15.3 % (ref 11.5–14.5)
EST. GFR  (NO RACE VARIABLE): >60 ML/MIN/1.73 M^2
ETHANOL SERPL-MCNC: <10 MG/DL
GLUCOSE SERPL-MCNC: 104 MG/DL (ref 70–110)
GLUCOSE UR QL STRIP: NEGATIVE
HCT VFR BLD AUTO: 40.3 % (ref 37–48.5)
HCV AB SERPL QL IA: NEGATIVE
HGB BLD-MCNC: 12.7 G/DL (ref 12–16)
HGB UR QL STRIP: NEGATIVE
HIV 1+2 AB+HIV1 P24 AG SERPL QL IA: NEGATIVE
HYALINE CASTS #/AREA URNS LPF: 4 /LPF
IMM GRANULOCYTES # BLD AUTO: 0.04 K/UL (ref 0–0.04)
IMM GRANULOCYTES NFR BLD AUTO: 0.5 % (ref 0–0.5)
KETONES UR QL STRIP: ABNORMAL
LACTATE SERPL-SCNC: 0.6 MMOL/L (ref 0.5–2.2)
LACTATE SERPL-SCNC: 9.5 MMOL/L (ref 0.5–2.2)
LEUKOCYTE ESTERASE UR QL STRIP: ABNORMAL
LYMPHOCYTES # BLD AUTO: 1.6 K/UL (ref 1–4.8)
LYMPHOCYTES NFR BLD: 19.9 % (ref 18–48)
MAGNESIUM SERPL-MCNC: 2 MG/DL (ref 1.6–2.6)
MCH RBC QN AUTO: 28.9 PG (ref 27–31)
MCHC RBC AUTO-ENTMCNC: 31.5 G/DL (ref 32–36)
MCV RBC AUTO: 92 FL (ref 82–98)
METHADONE UR QL SCN>300 NG/ML: NEGATIVE
MICROSCOPIC COMMENT: ABNORMAL
MONOCYTES # BLD AUTO: 0.8 K/UL (ref 0.3–1)
MONOCYTES NFR BLD: 9.4 % (ref 4–15)
NEUTROPHILS # BLD AUTO: 5.4 K/UL (ref 1.8–7.7)
NEUTROPHILS NFR BLD: 67.4 % (ref 38–73)
NITRITE UR QL STRIP: NEGATIVE
NRBC BLD-RTO: 0 /100 WBC
OPIATES UR QL SCN: NEGATIVE
PCP UR QL SCN>25 NG/ML: NEGATIVE
PH UR STRIP: 6 [PH] (ref 5–8)
PLATELET # BLD AUTO: 213 K/UL (ref 150–450)
PLATELET BLD QL SMEAR: ABNORMAL
PMV BLD AUTO: 11.7 FL (ref 9.2–12.9)
POTASSIUM SERPL-SCNC: 4.3 MMOL/L (ref 3.5–5.1)
PROT SERPL-MCNC: 7.8 G/DL (ref 6–8.4)
PROT UR QL STRIP: ABNORMAL
RBC # BLD AUTO: 4.39 M/UL (ref 4–5.4)
RBC #/AREA URNS HPF: 1 /HPF (ref 0–4)
SODIUM SERPL-SCNC: 134 MMOL/L (ref 136–145)
SP GR UR STRIP: 1.02 (ref 1–1.03)
SQUAMOUS #/AREA URNS HPF: 25 /HPF
TOXICOLOGY INFORMATION: ABNORMAL
URN SPEC COLLECT METH UR: ABNORMAL
UROBILINOGEN UR STRIP-ACNC: NEGATIVE EU/DL
WBC # BLD AUTO: 8.05 K/UL (ref 3.9–12.7)
WBC #/AREA URNS HPF: 3 /HPF (ref 0–5)

## 2023-04-25 PROCEDURE — 82077 ASSAY SPEC XCP UR&BREATH IA: CPT | Performed by: NURSE PRACTITIONER

## 2023-04-25 PROCEDURE — 93010 EKG 12-LEAD: ICD-10-PCS | Mod: ,,, | Performed by: INTERNAL MEDICINE

## 2023-04-25 PROCEDURE — 99285 EMERGENCY DEPT VISIT HI MDM: CPT | Mod: 25

## 2023-04-25 PROCEDURE — 93005 ELECTROCARDIOGRAM TRACING: CPT

## 2023-04-25 PROCEDURE — G0378 HOSPITAL OBSERVATION PER HR: HCPCS

## 2023-04-25 PROCEDURE — 87389 HIV-1 AG W/HIV-1&-2 AB AG IA: CPT | Performed by: NURSE PRACTITIONER

## 2023-04-25 PROCEDURE — 63600175 PHARM REV CODE 636 W HCPCS: Performed by: NURSE PRACTITIONER

## 2023-04-25 PROCEDURE — 25000003 PHARM REV CODE 250: Performed by: NURSE PRACTITIONER

## 2023-04-25 PROCEDURE — 83605 ASSAY OF LACTIC ACID: CPT | Mod: 91 | Performed by: NURSE PRACTITIONER

## 2023-04-25 PROCEDURE — 25000003 PHARM REV CODE 250: Performed by: INTERNAL MEDICINE

## 2023-04-25 PROCEDURE — 80053 COMPREHEN METABOLIC PANEL: CPT | Performed by: NURSE PRACTITIONER

## 2023-04-25 PROCEDURE — 83605 ASSAY OF LACTIC ACID: CPT | Performed by: INTERNAL MEDICINE

## 2023-04-25 PROCEDURE — 96361 HYDRATE IV INFUSION ADD-ON: CPT

## 2023-04-25 PROCEDURE — 93010 ELECTROCARDIOGRAM REPORT: CPT | Mod: ,,, | Performed by: INTERNAL MEDICINE

## 2023-04-25 PROCEDURE — 96374 THER/PROPH/DIAG INJ IV PUSH: CPT

## 2023-04-25 PROCEDURE — 99223 1ST HOSP IP/OBS HIGH 75: CPT | Mod: ,,, | Performed by: INTERNAL MEDICINE

## 2023-04-25 PROCEDURE — 81000 URINALYSIS NONAUTO W/SCOPE: CPT | Mod: 59 | Performed by: NURSE PRACTITIONER

## 2023-04-25 PROCEDURE — 81025 URINE PREGNANCY TEST: CPT | Performed by: NURSE PRACTITIONER

## 2023-04-25 PROCEDURE — 83735 ASSAY OF MAGNESIUM: CPT | Performed by: NURSE PRACTITIONER

## 2023-04-25 PROCEDURE — 86803 HEPATITIS C AB TEST: CPT | Performed by: NURSE PRACTITIONER

## 2023-04-25 PROCEDURE — 80307 DRUG TEST PRSMV CHEM ANLYZR: CPT | Performed by: NURSE PRACTITIONER

## 2023-04-25 PROCEDURE — 99223 PR INITIAL HOSPITAL CARE,LEVL III: ICD-10-PCS | Mod: ,,, | Performed by: INTERNAL MEDICINE

## 2023-04-25 PROCEDURE — 85025 COMPLETE CBC W/AUTO DIFF WBC: CPT | Performed by: NURSE PRACTITIONER

## 2023-04-25 RX ORDER — ACETAMINOPHEN 325 MG/1
650 TABLET ORAL EVERY 4 HOURS PRN
Status: DISCONTINUED | OUTPATIENT
Start: 2023-04-25 | End: 2023-04-26 | Stop reason: HOSPADM

## 2023-04-25 RX ORDER — VALPROIC ACID 250 MG/1
500 CAPSULE, LIQUID FILLED ORAL 2 TIMES DAILY
Status: DISCONTINUED | OUTPATIENT
Start: 2023-04-25 | End: 2023-04-26 | Stop reason: HOSPADM

## 2023-04-25 RX ORDER — LEVETIRACETAM 500 MG/5ML
2000 INJECTION, SOLUTION, CONCENTRATE INTRAVENOUS
Status: COMPLETED | OUTPATIENT
Start: 2023-04-25 | End: 2023-04-25

## 2023-04-25 RX ORDER — SODIUM CHLORIDE 0.9 % (FLUSH) 0.9 %
10 SYRINGE (ML) INJECTION
Status: DISCONTINUED | OUTPATIENT
Start: 2023-04-25 | End: 2023-04-26 | Stop reason: HOSPADM

## 2023-04-25 RX ORDER — ONDANSETRON 2 MG/ML
4 INJECTION INTRAMUSCULAR; INTRAVENOUS EVERY 6 HOURS PRN
Status: DISCONTINUED | OUTPATIENT
Start: 2023-04-25 | End: 2023-04-26 | Stop reason: HOSPADM

## 2023-04-25 RX ORDER — LORAZEPAM 2 MG/ML
2 INJECTION INTRAMUSCULAR
Status: DISCONTINUED | OUTPATIENT
Start: 2023-04-25 | End: 2023-04-26 | Stop reason: HOSPADM

## 2023-04-25 RX ORDER — ONDANSETRON 4 MG/1
4 TABLET, ORALLY DISINTEGRATING ORAL EVERY 6 HOURS PRN
Status: DISCONTINUED | OUTPATIENT
Start: 2023-04-25 | End: 2023-04-26 | Stop reason: HOSPADM

## 2023-04-25 RX ORDER — TALC
6 POWDER (GRAM) TOPICAL NIGHTLY PRN
Status: DISCONTINUED | OUTPATIENT
Start: 2023-04-25 | End: 2023-04-26 | Stop reason: HOSPADM

## 2023-04-25 RX ADMIN — LEVETIRACETAM 2000 MG: 500 INJECTION, SOLUTION INTRAVENOUS at 09:04

## 2023-04-25 RX ADMIN — VALPROIC ACID 500 MG: 250 CAPSULE ORAL at 09:04

## 2023-04-25 RX ADMIN — SODIUM CHLORIDE 1000 ML: 9 INJECTION, SOLUTION INTRAVENOUS at 09:04

## 2023-04-25 NOTE — ED TRIAGE NOTES
"Pt arrived via EMS for seizures. She is unaware of seizure activity, boyfriend reports seizure x2 at [facility]. Pt has  been there to detox for 3 weeks. She states," she does not know what she is detoxing from."  "

## 2023-04-25 NOTE — NURSING TRANSFER
Nursing Transfer Note      4/25/2023     Reason patient is being transferred: observation    Transfer From: ED    Transfer via wheelchair    Transfer with neither    Transported by transport    Medicines sent: no    Any special needs or follow-up needed: no    Chart send with patient: Yes    Notified: stays at a facility    Patient reassessed at: 4/25/2023    Upon arrival to floor: cardiac monitor applied, patient oriented to room, call bell in reach, and bed in lowest position    Seizure precautions in place, side rails padded. Bed low and locked. Will continue plan of care

## 2023-04-25 NOTE — TREATMENT PLAN
I spoke to the emergency room provider in detail regarding this patient as I was contacted on the telephone.  Apparently patient has a history of traumatic brain injury and prolonged hospitalization for 6 months following this and a history of being on Keppra for seizures.  Currently she is not on any antiepileptic medications and has been put on multiple psychotropic medications and resides at the [facility].  Patient was brought into the hospital due to generalized tonic-clonic movements suggestive of seizure.  Patient is a poor historian and can not confirm nor deny multiple aspects of her clinical history.  There may be a postictal component at this time.  Patient was loaded with 2 g of IV Keppra.  After discussion with ED provider, given psychiatric issues and recent initiation of psychotropic medications, Keppra may not be the best choice as an antiepileptic medication for this patient.  I have recommended Depakote 500 mg twice a day as this has mood stabilizing properties and is an antiepileptic.  Recommend treating any infectious or metabolic derangements.  If patient has seizures or there any other concerns, feel free to formally consult tele Neurology.  Please evaluate patient for withdrawal.    Michelle Worrlel MD

## 2023-04-25 NOTE — ASSESSMENT & PLAN NOTE
h/o TBI  - Seizure-like activity with unknown history of prior seizures though denied by patient but with TBI history as likely seizure focus.  - No acute intracranial abnormality or overt symptoms to suggest alternative etiology of tonic-clonic activity noted.  - Denies routine medication use, though unclear if she has other medications she takes at this time. Received levetiracetam 2g IV x1 in ED.  - Appreciate neurology recommendations. Start valproic acid 500mg PO BID.  - Seizure precautions. Start lorazepam 2mg IV q15min PRN for seizure activity.  - Referral to CM, neurology outpatient to coordinate follow-up; appears she has a history of poor adherence to care likely related to her TBI.

## 2023-04-25 NOTE — HPI
Ms. Diaz is a 32/F with PMH of traumatic brain injury/stroke who [facility] with 2 episodes of seizure-like behavior. History aided by [facility] staff; limited information able to be provided by the patient herself. Reportedly she was admitted 03/28 to [facility] for alcohol detoxicification and then transitioned to the residential side. She had not reported to [facility] staff on presentation that she had any prior seizures, though chart review of Neurology/PCP evaluations from 2020 shows she had been on levetiracetam remotely (potentially preventative?). The morning of 04/25 she was witnessed by residents initially to have seizure-like activity and then a second episode seen by nursing staff lasting 45-60sec with tonic-clonic activity without loss of bowel or bladder control. She received lorazepam 2mg IM x1; EMS subsequently arrived and she was brought to ED for further evaluation. ED workup was significant for CT head with no acute abnormality but age-advanced cerebral volume loss with atypical pattern of white matter disease with frontal periventricular predominance, and lactic acidosis. Neurology was consulted and recommended valproic acid initiation and hospital medicine contacted for admission.

## 2023-04-25 NOTE — NURSING
Nurses Note -- 4 Eyes      4/25/2023   3:42 PM      Skin assessed during: Admit      [x] No Altered Skin Integrity Present    []Prevention Measures Documented      [] Yes- Altered Skin Integrity Present or Discovered   [] LDA Added if Not in Epic (Describe Wound)   [] New Altered Skin Integrity was Present on Admit and Documented in LDA   [] Wound Image Taken    Wound Care Consulted? No    Attending Nurse:  Monika Hillman RN     Second RN/Staff Member:  ZAHRA Salmeron

## 2023-04-25 NOTE — ED PROVIDER NOTES
"Source of History:  Patient, chart, EMS    Chief complaint:  Seizures (Pt had 2 episodes of witnessed seizure like activity while at [facility], given 2mg ativan by facility staff; denies hx of seizures, pt currently 28 days into detox )      HPI:  Terrie Diaz is a 32 y.o. female with medical history of substance abuse, head trauma presenting with possible seizure this morning.  As per EMS, pt resides at [facility] and has been there since .  Pt unsure why she is at rehab and denies any drug or alcohol use.  As per pt significant other pt possibly had a "full body seizure" this morning.  Unsure how long episode lasted.  Pt denies any seizure history.      This is the extent to the patients complaints today here in the emergency department.    ROS: As per HPI and below:    Constitutional: No fever.  No chills.  Eyes: No visual changes.  ENT: No sore throat. No ear pain    Cardiovascular: No chest pain.  Respiratory: No shortness of breath.  GI: No abdominal pain.  No nausea or vomiting.  Genitourinary: No abnormal urination.  Neurologic: No headache. No focal weakness.  No numbness. Positive for seizure like activity  MSK: no back pain.  Integument: No rashes or lesions.  Hematologic: No easy bruising.  Endocrine: No excessive thirst or urination.    Review of patient's allergies indicates:  No Known Allergies    PMH:  As per HPI and below:  Past Medical History:   Diagnosis Date    Trauma      Past Surgical History:   Procedure Laterality Date     SECTION         Social History     Tobacco Use    Smoking status: Light Smoker     Types: Cigarettes    Smokeless tobacco: Never   Substance Use Topics    Alcohol use: Not Currently    Drug use: Yes     Types: Marijuana       Physical Exam:    /67   Pulse 101   Temp 98 °F (36.7 °C) (Oral)   Resp 19   SpO2 100%     Nursing note and vital signs reviewed.    Constitutional: No acute distress.  Nontoxic  Eyes: No conjunctival " injection.Extraocular muscles are intact.  Pupils equal round reactive 2-1 mm.  ENT: Oropharynx clear.  Normal phonation.   Cardiovascular: Regular rate and rhythm.  No murmurs. No gallops. No rubs  Respiratory: Clear to auscultation bilaterally.  Good air movement.  No wheezes.  No rhonchi. No rales. No accessory muscle use..  Abdomen: Soft.  Not distended.  Nontender.  No guarding.  No rebound. Non-peritoneal.  Musculoskeletal: Good range of motion all joints.  No deformities.  Neck supple.  No meningismus.  Skin: No rashes seen.  Good turgor.  No abrasions.  No ecchymoses.  Neurologic: Answering questions appropriately.  Slightly delayed in responses.  Motor intact.  Sensation intact.  No ataxia. No focal neurological deficits.  Psych: Withdrawn    MDM    Emergent evaluation of a 33 yo female presenting for possible seizure-like activity at her rehab facility this morning.  Patient denies any drug or alcohol use but is a resident at [facility] since 04/04.  Patient states she does not know why she is at [facility].  As per significant other patient had full body convulsions this morning.  Patient does not remember the event.  No loss of bladder or bowel control.  No biting of the tongue.  On exam pt is A&Ox3.  Slightly tachycardic but all other vital signs within normal limits.  Patient inappropriately but is slightly delayed in responses.  GCS 15. Nonfebrile and nontoxic appearing.  Pupils equal round reactive 2-1 mm.  Mucous membranes pink and moist.  Breath sounds clear bilaterally.  Abdomen soft and nontender. No rebound or guarding appreciated on exam.   BS WNL.  Pt speaking in full sentences. Cap refill < 3 seconds.      History Acquisition   Additional history was acquired from other historians.  EMS, chart  Chart shows pt was on Keppra in the past with no known seizure diagnosis.  Last prescribed in 2021    The patient's list of active medical problems, social history, medications, and allergies  as documented per RN staff has been reviewed.     Differential Diagnoses   Based on available information and the initial assessment, the working differential diagnoses considered during this evaluation include but are not limited to new onset seizure, breakthrough seizure, withdrawal, electrolyte abnormality, dehydration, syncope, others. .    I will get labs, imaging and reassess.  I discussed ths case with my supervising physician.        LABS     Labs Reviewed   CBC W/ AUTO DIFFERENTIAL - Abnormal; Notable for the following components:       Result Value    MCHC 31.5 (*)     RDW 15.3 (*)     All other components within normal limits    Narrative:     Release to patient->Immediate   COMPREHENSIVE METABOLIC PANEL - Abnormal; Notable for the following components:    Sodium 134 (*)     CO2 13 (*)     Anion Gap 17 (*)     All other components within normal limits    Narrative:     Release to patient->Immediate   URINALYSIS, REFLEX TO URINE CULTURE - Abnormal; Notable for the following components:    Appearance, UA Hazy (*)     Protein, UA 1+ (*)     Ketones, UA Trace (*)     Leukocytes, UA Trace (*)     All other components within normal limits    Narrative:     Specimen Source->Urine   LACTIC ACID, PLASMA - Abnormal; Notable for the following components:    Lactate (Lactic Acid) 9.5 (*)     All other components within normal limits    Narrative:     Release to patient->Immediate   LA  critical result(s) called and verbal readback obtained from   Brennon Paul R.N. by TB3 04/25/2023 09:39   DRUG SCREEN PANEL, URINE EMERGENCY - Abnormal; Notable for the following components:    THC Presumptive Positive (*)     All other components within normal limits    Narrative:     Specimen Source->Urine   URINALYSIS MICROSCOPIC - Abnormal; Notable for the following components:    Bacteria Few (*)     Hyaline Casts, UA 4 (*)     All other components within normal limits    Narrative:     Specimen Source->Urine   MAGNESIUM     Narrative:     Release to patient->Immediate   ALCOHOL,MEDICAL (ETHANOL)    Narrative:     Release to patient->Immediate   HIV 1 / 2 ANTIBODY    Narrative:     Release to patient->Immediate   HEPATITIS C ANTIBODY    Narrative:     Release to patient->Immediate   POCT URINE PREGNANCY         Imaging     Imaging Results               CT Head Without Contrast (Final result)  Result time 04/25/23 09:20:01      Final result by Alexander Kramer MD (04/25/23 09:20:01)                   Impression:      No evidence of acute hemorrhage or major vascular distribution infarct.    Age advanced cerebral volume loss with atypical pattern of underlying white matter disease (frontal periventricular predominance).  Clinical correlation advised with further workup as warranted.    This report was flagged in Epic as abnormal.      Electronically signed by: Alexander Kramer MD  Date:    04/25/2023  Time:    09:20               Narrative:    EXAMINATION:  CT HEAD WITHOUT CONTRAST    CLINICAL HISTORY:  Seizure, new-onset, no history of trauma;    TECHNIQUE:  Low dose axial CT images obtained throughout the head without the use of intravenous contrast.  Axial, sagittal and coronal reconstructions were performed.    COMPARISON:  None.    FINDINGS:  Intracranial compartment:    Age advanced cerebral volume loss particularly with prominence of the ventricles and sulci.  Configuration not suggestive hydrocephalus.    Patchy hypoattenuation in the cerebral white matter with frontal periventricular predominance.  No evidence of recent or remote major vascular distribution infarct.  No acute hemorrhage.  No intracranial mass effect or midline shift.    No extra-axial blood or fluid collections.    Skull/extracranial contents (limited evaluation):    No displaced calvarial fracture.    Mastoid air cells are clear. Mild patchy mucosal thickening in the visualized paranasal sinuses.                                      A radiology report was  available for my review at the time of the encounter.    EKG   EKG Readings: (Independently Interpreted)   Initial Reading: No STEMI. Rhythm: Sinus Tachycardia. Heart Rate: 102.   My interpretation     Additional Consideration   All available testing was considered during the course of this workup.  Comorbidities taken into consideration during the patient's evaluation and treatment include weight, age.    Social determinants of health were taken into consideration during development of our treatment plan.    Medications   sodium chloride 0.9% bolus 1,000 mL 1,000 mL (1,000 mLs Intravenous New Bag 4/25/23 0943)   levETIRAcetam injection 2,000 mg (2,000 mg Intravenous Given 4/25/23 0948)      ED Course as of 04/25/23 1056   Tue Apr 25, 2023   0931 CT head negative for any acute abnormalities.  Imaging does show age advanced cerebral volume loss with atypical pattern of underlying white matter disease  [RZ]   0934 CBC unremarkable.  No leukocytosis noted.  H&H stable at 12.7 and 40.3.  CMP unremarkable.  Ethanol negative.  Mag within normal limits. [RZ]   0943 Lactic elevated at 9.5.  This is consistent with new onset versus breakthrough seizure.  Will give loading dose of Keppra.  Will discussed case with hospital medicine for admission for seizure activity. [RZ]   0949 Discussed case with hospital medicine.  Will consult neurology.  Awaiting bed assignment.  [RZ]   1000 Urine drug screen presumed positive for marijuana.  No other acute abnormalities.  Awaiting admission. [RZ]   1044 Discussed case with neurology- recommends starting on Depakote 500mg BID.  Will be available for consult.  Hospital medicine aware.  Awaiting bed assignment.     [RZ]      ED Course User Index  [RZ] Lisandra Marie NP             CLINICAL IMPRESSION  1. Seizures    2. Tachycardia         ED Disposition Condition    Observation Stable             Lisandra Marie NP  04/25/23 1056

## 2023-04-25 NOTE — ED NOTES
Pt ambulatory to the restroom independently; steady gait noted. Pt returned to room 03 without incident.

## 2023-04-25 NOTE — H&P
[facility] Medicine  History & Physical    Patient Name: Terrie Diaz  MRN: [id number]  Patient Class: OP- Observation  Admission Date: 4/25/2023  Attending Physician: EDINSON Calderón MD  Primary Care Provider: Ramon Altamirano NP    Patient information was obtained from patient, ER records and [facility] staff.     Subjective:     Principal Problem:Seizure    Chief Complaint:   Chief Complaint   Patient presents with    Seizures     Pt had 2 episodes of witnessed seizure like activity while at [facility], given 2mg ativan by facility staff; denies hx of seizures, pt currently 28 days into detox         HPI: Ms. Diaz is a 32/F with PMH of traumatic brain injury/stroke who presented to [facility] 04/25 with 2 episodes of seizure-like behavior. History aided by [facility] staff; limited information able to be provided by the patient herself. Reportedly she was admitted 03/28 to [facility] for alcohol detoxicification and then transitioned to the residential side. She had not reported to [facility] staff on presentation that she had any prior seizures, though chart review of Neurology/PCP evaluations from 2020 shows she had been on levetiracetam remotely (potentially preventative?). The morning of 04/25 she was witnessed by residents initially to have seizure-like activity and then a second episode seen by nursing staff lasting 45-60sec with tonic-clonic activity without loss of bowel or bladder control. She received lorazepam 2mg IM x1; EMS subsequently arrived and she was brought to ED for further evaluation. ED workup was significant for CT head with no acute abnormality but age-advanced cerebral volume loss with atypical pattern of white matter disease with frontal periventricular predominance, and lactic acidosis. Neurology was consulted and recommended valproic acid initiation and hospital medicine contacted for admission.      Past Medical History:   Diagnosis Date    Trauma         Past Surgical History:   Procedure Laterality Date     SECTION         Review of patient's allergies indicates:  No Known Allergies    No current facility-administered medications on file prior to encounter.     Current Outpatient Medications on File Prior to Encounter   Medication Sig    HYDROcodone-acetaminophen (NORCO) 5-325 mg per tablet Take 1 tablet by mouth every 6 (six) hours as needed for Pain.    ibuprofen (ADVIL,MOTRIN) 600 MG tablet Take 1 tablet (600 mg total) by mouth every 6 (six) hours as needed for Pain.    prenatal vit-iron fum-folic ac (RIGHT STEP PRENATAL VITAMINS) 27 mg iron- 0.8 mg Tab Take 1 tablet by mouth once daily.    promethazine (PHENERGAN) 12.5 MG Tab Take 1 tablet (12.5 mg total) by mouth 4 (four) times daily.    sertraline (ZOLOFT) 25 MG tablet Take 1 tablet (25 mg total) by mouth once daily.     Family History       Problem Relation (Age of Onset)    Diabetes Paternal Grandmother, Sister          Tobacco Use    Smoking status: Light Smoker     Types: Cigarettes    Smokeless tobacco: Never   Substance and Sexual Activity    Alcohol use: Not Currently    Drug use: Yes     Types: Marijuana    Sexual activity: Yes     Partners: Male     Review of Systems   Constitutional:  Negative for chills and fever.   HENT:  Negative for sore throat and trouble swallowing.    Eyes:  Negative for pain and visual disturbance.   Respiratory:  Negative for cough and shortness of breath.    Cardiovascular:  Negative for chest pain and palpitations.   Gastrointestinal:  Negative for abdominal pain, nausea and vomiting.   Genitourinary:  Negative for difficulty urinating and dysuria.   Musculoskeletal:  Negative for arthralgias and myalgias.   Skin:  Negative for rash and wound.   Neurological:  Positive for seizures. Negative for weakness and numbness.   Objective:     Vital Signs (Most Recent):  Temp: 98 °F (36.7 °C) (23 0829)  Pulse: 84 (23 1422)  Resp: 18 (23  "1422)  BP: (!) 96/51 (04/25/23 1422)  SpO2: 98 % (04/25/23 1422)   Vital Signs (24h Range):  Temp:  [98 °F (36.7 °C)] 98 °F (36.7 °C)  Pulse:  [] 84  Resp:  [15-19] 18  SpO2:  [98 %-100 %] 98 %  BP: ()/(51-85) 96/51        There is no height or weight on file to calculate BMI.    Physical Exam  Vitals and nursing note reviewed.   Constitutional:       General: She is not in acute distress.     Appearance: She is well-developed.   HENT:      Head: Normocephalic and atraumatic.   Eyes:      General:         Right eye: No discharge.         Left eye: No discharge.      Conjunctiva/sclera: Conjunctivae normal.   Cardiovascular:      Rate and Rhythm: Normal rate.      Pulses: Normal pulses.   Pulmonary:      Effort: Pulmonary effort is normal. No respiratory distress.   Abdominal:      Palpations: Abdomen is soft.      Tenderness: There is no abdominal tenderness.   Musculoskeletal:         General: Normal range of motion.      Right lower leg: No edema.      Left lower leg: No edema.   Skin:     General: Skin is warm and dry.   Neurological:      Mental Status: She is alert.      Comments: Oriented x 1 (self, "hospital - in [place]?", unable to answer year)      Significant Labs:   CBC:  Recent Labs   Lab 04/25/23  0844   WBC 8.05   HGB 12.7   HCT 40.3      GRAN 67.4  5.4   LYMPH 19.9  1.6   MONO 9.4  0.8   EOS 0.2   BASO 0.05   CMP:  Recent Labs   Lab 04/25/23  0844   *   K 4.3      CO2 13*   BUN 17   CREATININE 1.0      CALCIUM 9.2   MG 2.0   ALKPHOS 87   AST 30   ALT 27   BILITOT 0.2   PROT 7.8   ALBUMIN 3.9   ANIONGAP 17*     Recent Labs   Lab 04/25/23  0846   LACTATE 9.5*     Significant Imaging:   Imaging Results               CT Head Without Contrast (Final result)  Result time 04/25/23 09:20:01      Final result by Alexander Kramer MD (04/25/23 09:20:01)                   Impression:      No evidence of acute hemorrhage or major vascular distribution infarct.    Age " advanced cerebral volume loss with atypical pattern of underlying white matter disease (frontal periventricular predominance).  Clinical correlation advised with further workup as warranted.    This report was flagged in Epic as abnormal.      Electronically signed by: Alexander Kramer MD  Date:    04/25/2023  Time:    09:20               Narrative:    EXAMINATION:  CT HEAD WITHOUT CONTRAST    CLINICAL HISTORY:  Seizure, new-onset, no history of trauma;    TECHNIQUE:  Low dose axial CT images obtained throughout the head without the use of intravenous contrast.  Axial, sagittal and coronal reconstructions were performed.    COMPARISON:  None.    FINDINGS:  Intracranial compartment:    Age advanced cerebral volume loss particularly with prominence of the ventricles and sulci.  Configuration not suggestive hydrocephalus.    Patchy hypoattenuation in the cerebral white matter with frontal periventricular predominance.  No evidence of recent or remote major vascular distribution infarct.  No acute hemorrhage.  No intracranial mass effect or midline shift.    No extra-axial blood or fluid collections.    Skull/extracranial contents (limited evaluation):    No displaced calvarial fracture.    Mastoid air cells are clear. Mild patchy mucosal thickening in the visualized paranasal sinuses.                                    Assessment/Plan:     * Seizure  h/o TBI  - Seizure-like activity with unknown history of prior seizures though denied by patient but with TBI history as likely seizure focus.  - No acute intracranial abnormality or overt symptoms to suggest alternative etiology of tonic-clonic activity noted.  - Denies routine medication use, though unclear if she has other medications she takes at this time. Received levetiracetam 2g IV x1 in ED.  - Appreciate neurology recommendations. Start valproic acid 500mg PO BID.  - Seizure precautions. Start lorazepam 2mg IV q15min PRN for seizure activity.  - Referral to [facility],  neurology outpatient to coordinate follow-up; appears she has a history of poor adherence to care likely related to her TBI.    VTE Risk Mitigation (From admission, onward)         Ordered     IP VTE LOW RISK PATIENT  Once         04/25/23 1144     Place sequential compression device  Until discontinued         04/25/23 1144                EDINSON Calderón MD  Department of Hospital Medicine  [facility], [place] - Emergency Dept

## 2023-04-25 NOTE — SUBJECTIVE & OBJECTIVE
Past Medical History:   Diagnosis Date    Trauma        Past Surgical History:   Procedure Laterality Date     SECTION         Review of patient's allergies indicates:  No Known Allergies    No current facility-administered medications on file prior to encounter.     Current Outpatient Medications on File Prior to Encounter   Medication Sig    HYDROcodone-acetaminophen (NORCO) 5-325 mg per tablet Take 1 tablet by mouth every 6 (six) hours as needed for Pain.    ibuprofen (ADVIL,MOTRIN) 600 MG tablet Take 1 tablet (600 mg total) by mouth every 6 (six) hours as needed for Pain.    prenatal vit-iron fum-folic ac (RIGHT STEP PRENATAL VITAMINS) 27 mg iron- 0.8 mg Tab Take 1 tablet by mouth once daily.    promethazine (PHENERGAN) 12.5 MG Tab Take 1 tablet (12.5 mg total) by mouth 4 (four) times daily.    sertraline (ZOLOFT) 25 MG tablet Take 1 tablet (25 mg total) by mouth once daily.     Family History       Problem Relation (Age of Onset)    Diabetes Paternal Grandmother, Sister          Tobacco Use    Smoking status: Light Smoker     Types: Cigarettes    Smokeless tobacco: Never   Substance and Sexual Activity    Alcohol use: Not Currently    Drug use: Yes     Types: Marijuana    Sexual activity: Yes     Partners: Male     Review of Systems   Constitutional:  Negative for chills and fever.   HENT:  Negative for sore throat and trouble swallowing.    Eyes:  Negative for pain and visual disturbance.   Respiratory:  Negative for cough and shortness of breath.    Cardiovascular:  Negative for chest pain and palpitations.   Gastrointestinal:  Negative for abdominal pain, nausea and vomiting.   Genitourinary:  Negative for difficulty urinating and dysuria.   Musculoskeletal:  Negative for arthralgias and myalgias.   Skin:  Negative for rash and wound.   Neurological:  Positive for seizures. Negative for weakness and numbness.   Objective:     Vital Signs (Most Recent):  Temp: 98 °F (36.7 °C) (23 0829)  Pulse:  "84 (04/25/23 1422)  Resp: 18 (04/25/23 1422)  BP: (!) 96/51 (04/25/23 1422)  SpO2: 98 % (04/25/23 1422)   Vital Signs (24h Range):  Temp:  [98 °F (36.7 °C)] 98 °F (36.7 °C)  Pulse:  [] 84  Resp:  [15-19] 18  SpO2:  [98 %-100 %] 98 %  BP: ()/(51-85) 96/51        There is no height or weight on file to calculate BMI.    Physical Exam  Vitals and nursing note reviewed.   Constitutional:       General: She is not in acute distress.     Appearance: She is well-developed.   HENT:      Head: Normocephalic and atraumatic.   Eyes:      General:         Right eye: No discharge.         Left eye: No discharge.      Conjunctiva/sclera: Conjunctivae normal.   Cardiovascular:      Rate and Rhythm: Normal rate.      Pulses: Normal pulses.   Pulmonary:      Effort: Pulmonary effort is normal. No respiratory distress.   Abdominal:      Palpations: Abdomen is soft.      Tenderness: There is no abdominal tenderness.   Musculoskeletal:         General: Normal range of motion.      Right lower leg: No edema.      Left lower leg: No edema.   Skin:     General: Skin is warm and dry.   Neurological:      Mental Status: She is alert.      Comments: Oriented x 1 (self, "hospital - in [place]?", unable to answer year)      Significant Labs:   CBC:  Recent Labs   Lab 04/25/23  0844   WBC 8.05   HGB 12.7   HCT 40.3      GRAN 67.4  5.4   LYMPH 19.9  1.6   MONO 9.4  0.8   EOS 0.2   BASO 0.05   CMP:  Recent Labs   Lab 04/25/23  0844   *   K 4.3      CO2 13*   BUN 17   CREATININE 1.0      CALCIUM 9.2   MG 2.0   ALKPHOS 87   AST 30   ALT 27   BILITOT 0.2   PROT 7.8   ALBUMIN 3.9   ANIONGAP 17*     Recent Labs   Lab 04/25/23  0846   LACTATE 9.5*     Significant Imaging:   Imaging Results               CT Head Without Contrast (Final result)  Result time 04/25/23 09:20:01      Final result by Alexander Kramer MD (04/25/23 09:20:01)                   Impression:      No evidence of acute hemorrhage or major " vascular distribution infarct.    Age advanced cerebral volume loss with atypical pattern of underlying white matter disease (frontal periventricular predominance).  Clinical correlation advised with further workup as warranted.    This report was flagged in Epic as abnormal.      Electronically signed by: Alexander Kramer MD  Date:    04/25/2023  Time:    09:20               Narrative:    EXAMINATION:  CT HEAD WITHOUT CONTRAST    CLINICAL HISTORY:  Seizure, new-onset, no history of trauma;    TECHNIQUE:  Low dose axial CT images obtained throughout the head without the use of intravenous contrast.  Axial, sagittal and coronal reconstructions were performed.    COMPARISON:  None.    FINDINGS:  Intracranial compartment:    Age advanced cerebral volume loss particularly with prominence of the ventricles and sulci.  Configuration not suggestive hydrocephalus.    Patchy hypoattenuation in the cerebral white matter with frontal periventricular predominance.  No evidence of recent or remote major vascular distribution infarct.  No acute hemorrhage.  No intracranial mass effect or midline shift.    No extra-axial blood or fluid collections.    Skull/extracranial contents (limited evaluation):    No displaced calvarial fracture.    Mastoid air cells are clear. Mild patchy mucosal thickening in the visualized paranasal sinuses.

## 2023-04-26 VITALS
TEMPERATURE: 99 F | BODY MASS INDEX: 22.81 KG/M2 | SYSTOLIC BLOOD PRESSURE: 113 MMHG | HEART RATE: 73 BPM | RESPIRATION RATE: 17 BRPM | DIASTOLIC BLOOD PRESSURE: 64 MMHG | HEIGHT: 63 IN | WEIGHT: 128.75 LBS | OXYGEN SATURATION: 98 %

## 2023-04-26 LAB
ANION GAP SERPL CALC-SCNC: 6 MMOL/L (ref 8–16)
BUN SERPL-MCNC: 10 MG/DL (ref 6–20)
CALCIUM SERPL-MCNC: 8.6 MG/DL (ref 8.7–10.5)
CHLORIDE SERPL-SCNC: 112 MMOL/L (ref 95–110)
CO2 SERPL-SCNC: 22 MMOL/L (ref 23–29)
CREAT SERPL-MCNC: 0.8 MG/DL (ref 0.5–1.4)
EST. GFR  (NO RACE VARIABLE): >60 ML/MIN/1.73 M^2
GLUCOSE SERPL-MCNC: 74 MG/DL (ref 70–110)
MAGNESIUM SERPL-MCNC: 2 MG/DL (ref 1.6–2.6)
PHOSPHATE SERPL-MCNC: 3.3 MG/DL (ref 2.7–4.5)
POTASSIUM SERPL-SCNC: 4.5 MMOL/L (ref 3.5–5.1)
SODIUM SERPL-SCNC: 140 MMOL/L (ref 136–145)

## 2023-04-26 PROCEDURE — 25000003 PHARM REV CODE 250: Performed by: INTERNAL MEDICINE

## 2023-04-26 PROCEDURE — 36415 COLL VENOUS BLD VENIPUNCTURE: CPT | Performed by: INTERNAL MEDICINE

## 2023-04-26 PROCEDURE — 99239 HOSP IP/OBS DSCHRG MGMT >30: CPT | Mod: ,,, | Performed by: INTERNAL MEDICINE

## 2023-04-26 PROCEDURE — G0378 HOSPITAL OBSERVATION PER HR: HCPCS

## 2023-04-26 PROCEDURE — 99239 PR HOSPITAL DISCHARGE DAY,>30 MIN: ICD-10-PCS | Mod: ,,, | Performed by: INTERNAL MEDICINE

## 2023-04-26 PROCEDURE — 80048 BASIC METABOLIC PNL TOTAL CA: CPT | Performed by: INTERNAL MEDICINE

## 2023-04-26 PROCEDURE — 84100 ASSAY OF PHOSPHORUS: CPT | Performed by: INTERNAL MEDICINE

## 2023-04-26 PROCEDURE — 83735 ASSAY OF MAGNESIUM: CPT | Performed by: INTERNAL MEDICINE

## 2023-04-26 RX ORDER — VALPROIC ACID 250 MG/1
500 CAPSULE, LIQUID FILLED ORAL 2 TIMES DAILY
Qty: 120 CAPSULE | Refills: 1 | Status: SHIPPED | OUTPATIENT
Start: 2023-04-26

## 2023-04-26 RX ADMIN — VALPROIC ACID 500 MG: 250 CAPSULE ORAL at 09:04

## 2023-04-26 NOTE — PLAN OF CARE
Patient will be returning to [facility]. [facility] return paperwork completed and returned to patient. [facility] will come to pick patient up once fully discharged.    Depakote out of stock with [facility]. MD called into [facility].   04/26/23 1041   Final Note   Assessment Type Final Discharge Note   Anticipated Discharge Disposition Home   Hospital Resources/Appts/Education Provided Provided patient/caregiver with written discharge plan information   Post-Acute Status   Discharge Delays (!) Taxi Service Set-up     [facility] ([address])  Discharge Final Note    Primary Care Provider: [facility]    Expected Discharge Date: 4/26/2023    Final Discharge Note (most recent)       Final Note - 04/26/23 1041          Final Note    Assessment Type Final Discharge Note (P)      Anticipated Discharge Disposition Home or Self Care (P)      Hospital Resources/Appts/Education Provided Provided patient/caregiver with written discharge plan information (P)         Post-Acute Status    Discharge Delays Taxi Service Set-up (P)                      Important Message from Medicare             Contact Info       Ramon Altamirano NP   Specialty: Family Medicine    [address]  [facility] [address]   Phone: [phone]       Next Steps: Follow up in 2 week(s)    Instructions: post-hospital follow-up

## 2023-04-26 NOTE — DISCHARGE SUMMARY
[facility] Surg ([address] Medicine  Discharge Summary      Patient Name: Terrie Diaz  MRN: [id number]  PRESTON: [id number]  Patient Class: OP- Observation  Admission Date: 4/25/2023  Hospital Length of Stay: 0 days  Discharge Date and Time: 4/26/2023 11:58 AM  Attending Physician: No att. providers found   Discharging Provider: EDINSON Calderón MD  Primary Care Provider: Tash Sanchez    Primary Care Team: Networked reference to record PCT     HPI:   Ms. Diaz is a 32/F with PMH of traumatic brain injury/stroke who presented to [facility] 04/25 with 2 episodes of seizure-like behavior. History aided by [facility] staff; limited information able to be provided by the patient herself. Reportedly she was admitted 03/28 to [facility] for alcohol detoxicification and then transitioned to the residential side. She had not reported to [facility] staff on presentation that she had any prior seizures, though chart review of Neurology/PCP evaluations from 2020 shows she had been on levetiracetam remotely (potentially preventative?). The morning of 04/25 she was witnessed by residents initially to have seizure-like activity and then a second episode seen by nursing staff lasting 45-60sec with tonic-clonic activity without loss of bowel or bladder control. She received lorazepam 2mg IM x1; EMS subsequently arrived and she was brought to ED for further evaluation. ED workup was significant for CT head with no acute abnormality but age-advanced cerebral volume loss with atypical pattern of white matter disease with frontal periventricular predominance, and lactic acidosis. Neurology was consulted and recommended valproic acid initiation and hospital medicine contacted for admission.      * No surgery found *      Hospital Course:   Admitted with seizure with unclear prior history of seizure episodes in setting of prior TBI. Started depakote. Had no recurrence of seizure activity. Plan for outpatient follow-up and  referred to outpatient case management given sporadic follow-up noted on chart review. With clinical improvement and vital stability, she was prepared for discharge.    Goals of Care Treatment Preferences:  Code Status: Full Code      Consults:     No new Assessment & Plan notes have been filed under this hospital service since the last note was generated.  Service: Hospital Medicine    Final Active Diagnoses:    Diagnosis Date Noted POA    PRINCIPAL PROBLEM:  Seizure [R56.9] 04/25/2023 Yes    History of traumatic brain injury [Z87.820] 04/25/2023 Not Applicable     Chronic      Problems Resolved During this Admission:       Discharged Condition: good    Disposition: Home or Self Care    Follow Up:   Follow-up Information       Ramon Altamirano NP Follow up in 2 week(s).    Specialty: Family Medicine  Why: post-hospital follow-up  Contact information:  [address]  [facility] [address] [phone]                           Patient Instructions:      Ambulatory referral/consult to Outpatient Case Management   Referral Priority: Urgent Referral Type: Consultation   Referral Reason: Specialty Services Required   Number of Visits Requested: 1     Diet Adult Regular     Notify your health care provider if you experience any of the following:  increased confusion or weakness     Notify your health care provider if you experience any of the following:  persistent dizziness, light-headedness, or visual disturbances     Notify your health care provider if you experience any of the following:  severe persistent headache     Activity as tolerated       Significant Diagnostic Studies:   CBC:  Recent Labs   Lab 04/25/23  0844   WBC 8.05   HGB 12.7   HCT 40.3      GRAN 67.4  5.4   LYMPH 19.9  1.6   MONO 9.4  0.8   EOS 0.2   BASO 0.05     CMP:  Recent Labs   Lab 04/25/23  0844 04/26/23  0442   * 140   K 4.3 4.5    112*   CO2 13* 22*   BUN 17 10   CREATININE 1.0 0.8     74   CALCIUM 9.2 8.6*   MG 2.0 2.0   PHOS  --  3.3   ALKPHOS 87  --    AST 30  --    ALT 27  --    BILITOT 0.2  --    PROT 7.8  --    ALBUMIN 3.9  --    ANIONGAP 17* 6*     Imaging Results               CT Head Without Contrast (Final result)  Result time 04/25/23 09:20:01      Final result by Alexander Kramer MD (04/25/23 09:20:01)                   Impression:      No evidence of acute hemorrhage or major vascular distribution infarct.    Age advanced cerebral volume loss with atypical pattern of underlying white matter disease (frontal periventricular predominance).  Clinical correlation advised with further workup as warranted.    This report was flagged in Epic as abnormal.      Electronically signed by: Alexander Kramer MD  Date:    04/25/2023  Time:    09:20               Narrative:    EXAMINATION:  CT HEAD WITHOUT CONTRAST    CLINICAL HISTORY:  Seizure, new-onset, no history of trauma;    TECHNIQUE:  Low dose axial CT images obtained throughout the head without the use of intravenous contrast.  Axial, sagittal and coronal reconstructions were performed.    COMPARISON:  None.    FINDINGS:  Intracranial compartment:    Age advanced cerebral volume loss particularly with prominence of the ventricles and sulci.  Configuration not suggestive hydrocephalus.    Patchy hypoattenuation in the cerebral white matter with frontal periventricular predominance.  No evidence of recent or remote major vascular distribution infarct.  No acute hemorrhage.  No intracranial mass effect or midline shift.    No extra-axial blood or fluid collections.    Skull/extracranial contents (limited evaluation):    No displaced calvarial fracture.    Mastoid air cells are clear. Mild patchy mucosal thickening in the visualized paranasal sinuses.                                      Pending Diagnostic Studies:       None           Medications:  Reconciled Home Medications:      Medication List        START taking these medications       valproic acid 250 mg capsule  Commonly known as: DEPAKENE  Take 2 capsules (500 mg total) by mouth 2 (two) times daily.              Indwelling Lines/Drains at time of discharge:   Lines/Drains/Airways       None                   Time spent on the discharge of patient: 35 minutes         EDINSON Calderón MD  Department of Hospital Medicine  [facility] ([address])

## 2023-04-26 NOTE — NURSING
Patient awake, alert, oriented ambulating to bathroom, standby assistance needed. No signs of distress observed. bed low and locked. Call light in reach. Report given to oncoming nurse. 12hour chart check complete. Will continue plan of care.

## 2023-04-26 NOTE — PLAN OF CARE
Problem: Infection  Goal: Absence of Infection Signs and Symptoms  Outcome: Met     Problem: Adult Inpatient Plan of Care  Goal: Plan of Care Review  Outcome: Met  Goal: Patient-Specific Goal (Individualized)  Outcome: Met  Goal: Absence of Hospital-Acquired Illness or Injury  Outcome: Met  Goal: Optimal Comfort and Wellbeing  Outcome: Met  Goal: Readiness for Transition of Care  Outcome: Met     Problem: Seizure, Active Management  Goal: Absence of Seizure/Seizure-Related Injury  Outcome: Met    Patient discharged

## 2023-04-26 NOTE — NURSING
Report received from off going nurse, SANDRO Greenberg. Patient AAO. No signs of distress noted. Call light in reach. Bed low and locked. Will continue plan of care.

## 2023-04-26 NOTE — PLAN OF CARE
Problem: Seizure, Active Management  Goal: Absence of Seizure/Seizure-Related Injury  Outcome: Ongoing, Progressing  Intervention: Prevent Seizure-Related Injury  Flowsheets (Taken 4/26/2023 0221)  Seizure Precautions:   activity supervised   side rails padded   clutter-free environment maintained   soft boundaries provided